# Patient Record
Sex: MALE | Race: WHITE | NOT HISPANIC OR LATINO | Employment: FULL TIME | URBAN - METROPOLITAN AREA
[De-identification: names, ages, dates, MRNs, and addresses within clinical notes are randomized per-mention and may not be internally consistent; named-entity substitution may affect disease eponyms.]

---

## 2024-05-29 ENCOUNTER — OFFICE VISIT (OUTPATIENT)
Dept: URGENT CARE | Facility: CLINIC | Age: 59
End: 2024-05-29
Payer: COMMERCIAL

## 2024-05-29 VITALS
SYSTOLIC BLOOD PRESSURE: 121 MMHG | HEART RATE: 103 BPM | OXYGEN SATURATION: 95 % | BODY MASS INDEX: 29.25 KG/M2 | DIASTOLIC BLOOD PRESSURE: 93 MMHG | TEMPERATURE: 97.8 F | RESPIRATION RATE: 18 BRPM | HEIGHT: 66 IN | WEIGHT: 182 LBS

## 2024-05-29 DIAGNOSIS — J01.00 ACUTE NON-RECURRENT MAXILLARY SINUSITIS: Primary | ICD-10-CM

## 2024-05-29 PROCEDURE — G0382 LEV 3 HOSP TYPE B ED VISIT: HCPCS | Performed by: STUDENT IN AN ORGANIZED HEALTH CARE EDUCATION/TRAINING PROGRAM

## 2024-05-29 PROCEDURE — S9083 URGENT CARE CENTER GLOBAL: HCPCS | Performed by: STUDENT IN AN ORGANIZED HEALTH CARE EDUCATION/TRAINING PROGRAM

## 2024-05-29 RX ORDER — AMOXICILLIN AND CLAVULANATE POTASSIUM 875; 125 MG/1; MG/1
1 TABLET, FILM COATED ORAL EVERY 12 HOURS SCHEDULED
Qty: 28 TABLET | Refills: 0 | Status: SHIPPED | OUTPATIENT
Start: 2024-06-04 | End: 2024-06-18

## 2024-05-29 RX ORDER — GLUCOSAMINE HCL 500 MG
200 TABLET ORAL
COMMUNITY

## 2024-05-29 RX ORDER — FLUTICASONE PROPIONATE 50 MCG
1 SPRAY, SUSPENSION (ML) NASAL 2 TIMES DAILY
Qty: 16 G | Refills: 0 | Status: SHIPPED | OUTPATIENT
Start: 2024-05-29

## 2024-05-29 RX ORDER — SULFAMETHOXAZOLE AND TRIMETHOPRIM 800; 160 MG/1; MG/1
TABLET ORAL
COMMUNITY
Start: 2024-05-17

## 2024-05-29 NOTE — PROGRESS NOTES
[facility]        NAME: Av Fatima is a 58 y.o. male  : 1965    MRN: [id number]    Assessment and Plan   Acute non-recurrent maxillary sinusitis [J01.00]  1. Acute non-recurrent maxillary sinusitis  fluticasone (FLONASE) 50 mcg/act nasal spray    amoxicillin-clavulanate (AUGMENTIN) 875-125 mg per tablet          Low suspicion for bacterial etiology of sinusitis at this time. Discussed symptomatic and supportive measures of management. Sent postdated antibiotic to start if symptoms persist past 14 days since onset.     Patient Instructions     See wrap up for details  Proceed to  ER if symptoms worsen.    Chief Complaint     Chief Complaint   Patient presents with    Cold Like Symptoms     Started Tuesday with cough congestion in chest has a runny nose and feels chest heaviness also feeling pain across shoulders and having tenderness under both eyes          History of Present Illness     HPI    P/w onset of symptoms 1 week ago  Reports PND, productive cough, congestion, rhinorrhea, sinus pressure  Gets pain across the upper back when he coughs and chest feels tight then but otherwise no chest pain, diaphoresis, chest heaviness  Denies fever, chills, nausea, vomiting   Took tylenol sinus intermittently with improvement     Review of Systems   Review of Systems   Constitutional:  Negative for chills and fever.   HENT:  Positive for congestion, postnasal drip, rhinorrhea and sinus pressure. Negative for ear pain and sore throat.    Eyes:  Negative for pain and visual disturbance.   Respiratory:  Positive for cough. Negative for shortness of breath.    Cardiovascular:  Negative for chest pain and palpitations.   Gastrointestinal:  Negative for abdominal pain and vomiting.   Genitourinary:  Negative for dysuria and hematuria.   Musculoskeletal:  Negative for arthralgias and back pain.   Skin:  Negative for color change and rash.   Neurological:  Negative for seizures and syncope.   All other  "systems reviewed and are negative.    Current Medications       Current Outpatient Medications:     [START ON 6/4/2024] amoxicillin-clavulanate (AUGMENTIN) 875-125 mg per tablet, Take 1 tablet by mouth every 12 (twelve) hours for 14 days Do not start before June 4, 2024., Disp: 28 tablet, Rfl: 0    fluticasone (FLONASE) 50 mcg/act nasal spray, 1 spray into each nostril 2 (two) times a day, Disp: 16 g, Rfl: 0    sulfamethoxazole-trimethoprim (BACTRIM DS) 800-160 mg per tablet, , Disp: , Rfl:     Coenzyme Q10 100 MG TABS, Take 200 mg by mouth, Disp: , Rfl:     Current Allergies     Allergies as of 05/29/2024 - never reviewed   Allergen Reaction Noted    Simvastatin Other (See Comments) 11/22/2012            The following portions of the patient's history were reviewed and updated as appropriate: allergies, current medications, past family history, past medical history, past social history, past surgical history and problem list.     No past medical history on file.    No past surgical history on file.    No family history on file.      Medications have been verified.        Objective   /93   Pulse 103   Temp 97.8 °F (36.6 °C)   Resp 18   Ht 5' 6\" (1.676 m)   Wt 82.6 kg (182 lb)   SpO2 95%   BMI 29.38 kg/m²        Physical Exam     Physical Exam  Constitutional:       Appearance: Normal appearance.   HENT:      Head: Normocephalic and atraumatic.      Right Ear: Tympanic membrane and ear canal normal.      Left Ear: Tympanic membrane and ear canal normal.      Nose: Congestion present.      Right Sinus: Maxillary sinus tenderness present.      Left Sinus: Maxillary sinus tenderness present.      Mouth/Throat:      Pharynx: Oropharynx is clear. Posterior oropharyngeal erythema present.   Eyes:      General: No scleral icterus.  Cardiovascular:      Rate and Rhythm: Normal rate and regular rhythm.   Pulmonary:      Effort: Pulmonary effort is normal. No respiratory distress.      Breath sounds: Normal breath " sounds.   Neurological:      General: No focal deficit present.      Mental Status: He is alert and oriented to person, place, and time.   Psychiatric:         Mood and Affect: Mood normal.         Behavior: Behavior normal.

## 2024-05-29 NOTE — PATIENT INSTRUCTIONS
Saline sinus rinse (like NeilMed Sinus Rinse) twice daily followed by flonase 1 spray twice daily - only use sterile or distilled water with sinus rinse to avoid further infections.  Tylenol Sinus and Mucinex over the counter  Warm compresses over sinuses  Steam treatment (practice proper safety precautions when handing hot liquids/steam)  Over the counter saline nasal spray

## 2024-12-20 ENCOUNTER — OFFICE VISIT (OUTPATIENT)
Dept: URGENT CARE | Facility: CLINIC | Age: 59
End: 2024-12-20
Payer: COMMERCIAL

## 2024-12-20 VITALS
SYSTOLIC BLOOD PRESSURE: 135 MMHG | HEIGHT: 66 IN | TEMPERATURE: 97.8 F | BODY MASS INDEX: 30.53 KG/M2 | RESPIRATION RATE: 16 BRPM | WEIGHT: 190 LBS | DIASTOLIC BLOOD PRESSURE: 76 MMHG | HEART RATE: 111 BPM | OXYGEN SATURATION: 94 %

## 2024-12-20 DIAGNOSIS — M00.9 PYOGENIC ARTHRITIS OF LEFT KNEE JOINT, DUE TO UNSPECIFIED ORGANISM (HCC): Primary | ICD-10-CM

## 2024-12-20 LAB — GLUCOSE SERPL-MCNC: 252 MG/DL (ref 65–140)

## 2024-12-20 PROCEDURE — S9083 URGENT CARE CENTER GLOBAL: HCPCS

## 2024-12-20 PROCEDURE — 82948 REAGENT STRIP/BLOOD GLUCOSE: CPT

## 2024-12-20 PROCEDURE — G0382 LEV 3 HOSP TYPE B ED VISIT: HCPCS

## 2024-12-20 RX ORDER — RABEPRAZOLE SODIUM 20 MG/1
TABLET, DELAYED RELEASE ORAL
COMMUNITY
Start: 2024-07-11

## 2024-12-20 RX ORDER — METOPROLOL SUCCINATE 100 MG/1
TABLET, EXTENDED RELEASE ORAL
COMMUNITY
Start: 2024-07-31

## 2024-12-20 RX ORDER — ROSUVASTATIN CALCIUM 40 MG/1
TABLET, COATED ORAL
COMMUNITY
Start: 2024-07-11

## 2024-12-20 RX ORDER — LEVOFLOXACIN 500 MG/1
TABLET, FILM COATED ORAL
COMMUNITY
Start: 2024-11-25

## 2024-12-20 RX ORDER — MAGNESIUM GLUCONATE 27 MG(500)
TABLET ORAL
COMMUNITY

## 2024-12-20 RX ORDER — POTASSIUM CITRATE 15 MEQ/1
TABLET, EXTENDED RELEASE ORAL
COMMUNITY

## 2024-12-20 RX ORDER — INSULIN DEGLUDEC 200 U/ML
INJECTION, SOLUTION SUBCUTANEOUS
COMMUNITY
Start: 2024-09-10

## 2024-12-20 RX ORDER — MUPIROCIN 20 MG/G
OINTMENT TOPICAL
COMMUNITY
Start: 2024-12-05

## 2024-12-20 RX ORDER — INSULIN LISPRO 200 [IU]/ML
INJECTION, SOLUTION SUBCUTANEOUS
COMMUNITY
Start: 2024-11-11

## 2024-12-20 RX ORDER — AMPICILLIN TRIHYDRATE 250 MG
CAPSULE ORAL
COMMUNITY

## 2024-12-20 RX ORDER — MELOXICAM 7.5 MG/1
TABLET ORAL
COMMUNITY

## 2024-12-20 RX ORDER — IRBESARTAN 300 MG/1
TABLET ORAL
COMMUNITY

## 2024-12-20 NOTE — PATIENT INSTRUCTIONS
Recommended patient proceed to ED for further evaluation. Discussed risks of delayed evaluation and intervention with serious etiology. Additionally discussed risks of personal transport vs transport via ambulance. Patient verbalized understanding.     .  Concerned for septic left knee joint.  Left knee with previous replacement, edematous, and hot with tenderness to palpation. Needs higher level of care for further evaluation.    Follow up with PCP in 3-5 days.  Proceed to  ER if symptoms worsen.    If tests are performed, our office will contact you with results only if changes need to made to the care plan discussed with you at the visit. You can review your full results on [facility].

## 2024-12-20 NOTE — PROGRESS NOTES
[facility]        NAME: Av Fatima is a 59 y.o. male  : 1965    MRN: [id number]  DATE: 2024  TIME: 10:40 AM    Assessment and Plan   Pyogenic arthritis of left knee joint, due to unspecified organism (HCC) [M00.9]  1. Pyogenic arthritis of left knee joint, due to unspecified organism (HCC)  Transfer to other facility            Patient Instructions     Recommended patient proceed to ED for further evaluation. Discussed risks of delayed evaluation and intervention with serious etiology. Additionally discussed risks of personal transport vs transport via ambulance. Patient verbalized understanding.     .  Concerned for septic left knee joint.  Left knee with previous replacement, edematous, and hot with tenderness to palpation. Needs higher level of care for further evaluation.    Follow up with PCP in 3-5 days.  Proceed to  ER if symptoms worsen.    If tests are performed, our office will contact you with results only if changes need to made to the care plan discussed with you at the visit. [facility]    Chief Complaint     Chief Complaint   Patient presents with    Leg Swelling     Left leg swelling; feels like he cannot lift it, pain 9/10  Started: Saturday   Had surgery on right foot back in January this year and is putting more pressure onto left foot            History of Present Illness       59 year old male with PMH of type 2 diabetes arrives reporting 1 week of left knee pain and swelling.  Patient reports he has been using a scooter to get around due to surgery on his right foot from a diabetic foot ulcer with complications of osteomyelitis.  Patient reports he is currently on an oral antibiotic for the right foot surgery and infection.  Patient denies any recent fevers.  Patient reports he did slide off of his scooter and thinks he injured left knee at that time.  Patient's left knee is very edematous, tender to  palpation and very hot to touch.  Patient has decreased range of motion in left knee and has difficulty ambulating and bearing weight on left knee due to pain and swelling.        Review of Systems   Review of Systems   Constitutional: Negative.  Negative for chills, diaphoresis, fatigue and fever.   HENT: Negative.     Respiratory: Negative.     Cardiovascular: Negative.    Gastrointestinal: Negative.    Musculoskeletal:  Positive for arthralgias (left leg pain) and joint swelling (left knee).   Skin: Negative.          Current Medications       Current Outpatient Medications:     Aciphex 20 MG tablet, , Disp: , Rfl:     Coenzyme Q10 100 MG TABS, Take 200 mg by mouth, Disp: , Rfl:     fluticasone (FLONASE) 50 mcg/act nasal spray, 1 spray into each nostril 2 (two) times a day, Disp: 16 g, Rfl: 0    Gabapentin 25 MG TABS, Take by mouth, Disp: , Rfl:     HumaLOG KwikPen 200 units/mL CONCENTRATED U-200 injection pen, , Disp: , Rfl:     levofloxacin (LEVAQUIN) 500 mg tablet, Take by mouth, Disp: , Rfl:     metoprolol succinate (TOPROL-XL) 100 mg 24 hr tablet, , Disp: , Rfl:     mupirocin (BACTROBAN) 2 % ointment, , Disp: , Rfl:     rosuvastatin (CRESTOR) 40 MG tablet, , Disp: , Rfl:     Tresiba FlexTouch 200 units/mL CONCENTRATED U-200 injection pen, , Disp: , Rfl:     Apple Cider Vinegar 600 MG CAPS, Take by mouth, Disp: , Rfl:     Cinnamon 500 MG capsule, Take by mouth, Disp: , Rfl:     GARLIC PO, Take 1 capsule by mouth daily, Disp: , Rfl:     irbesartan (AVAPRO) 300 mg tablet, , Disp: , Rfl:     magnesium gluconate (Mag-G) 500 mg tablet, , Disp: , Rfl:     meloxicam (MOBIC) 7.5 mg tablet, , Disp: , Rfl:     Potassium Citrate ER (Urocit-K 15) 15 MEQ (1620 MG) TBCR, , Disp: , Rfl:     sulfamethoxazole-trimethoprim (BACTRIM DS) 800-160 mg per tablet, , Disp: , Rfl:     Current Allergies     Allergies as of 12/20/2024 - Reviewed 12/20/2024   Allergen Reaction Noted    Simvastatin Other (See Comments) 11/22/2012         "    The following portions of the patient's history were reviewed and updated as appropriate: allergies, current medications, past family history, past medical history, past social history, past surgical history and problem list.     Past Medical History:   Diagnosis Date    Kidney problem     Type 2 diabetes mellitus (HCC)        Past Surgical History:   Procedure Laterality Date    CARDIAC SURGERY      triple bypass    FINGER SURGERY      FOOT AMPUTATION      FOOT SURGERY      HIP SURGERY      KNEE SURGERY         History reviewed. No pertinent family history.      Medications have been verified.        Objective   /76   Pulse (!) 111   Temp 97.8 °F (36.6 °C)   Resp 16   Ht 5' 6\" (1.676 m)   Wt 86.2 kg (190 lb)   SpO2 94%   BMI 30.67 kg/m²        Physical Exam     Physical Exam  Vitals and nursing note reviewed.   Constitutional:       Appearance: Normal appearance. He is not toxic-appearing or diaphoretic.   HENT:      Head: Normocephalic.      Right Ear: External ear normal.      Left Ear: External ear normal.      Nose: Nose normal.      Mouth/Throat:      Mouth: Mucous membranes are moist.      Pharynx: No posterior oropharyngeal erythema.   Eyes:      Extraocular Movements: Extraocular movements intact.      Pupils: Pupils are equal, round, and reactive to light.   Cardiovascular:      Rate and Rhythm: Tachycardia present.      Pulses: Normal pulses.      Heart sounds: Normal heart sounds.   Pulmonary:      Effort: Pulmonary effort is normal. No respiratory distress.      Breath sounds: Normal breath sounds. No stridor. No wheezing, rhonchi or rales.   Chest:      Chest wall: No tenderness.   Musculoskeletal:         General: Swelling, tenderness and signs of injury present. No deformity.      Cervical back: Normal range of motion.      Left knee: Swelling, effusion and bony tenderness present. No deformity or ecchymosis. Decreased range of motion. Tenderness present over the medial joint line, " lateral joint line, MCL, LCL, ACL and PCL. No LCL laxity, MCL laxity, ACL laxity or PCL laxity.Normal alignment, normal meniscus and normal patellar mobility. Normal pulse.      Right lower leg: No edema.      Left lower leg: Swelling and tenderness present. No deformity, lacerations or bony tenderness. 2+ Edema present.   Lymphadenopathy:      Cervical: No cervical adenopathy.   Skin:     General: Skin is warm and dry.      Capillary Refill: Capillary refill takes less than 2 seconds.   Neurological:      General: No focal deficit present.      Mental Status: He is alert and oriented to person, place, and time.   Psychiatric:         Mood and Affect: Mood normal.